# Patient Record
Sex: FEMALE | Race: OTHER
[De-identification: names, ages, dates, MRNs, and addresses within clinical notes are randomized per-mention and may not be internally consistent; named-entity substitution may affect disease eponyms.]

---

## 2021-11-17 ENCOUNTER — HOSPITAL ENCOUNTER (EMERGENCY)
Dept: HOSPITAL 54 - ER | Age: 41
LOS: 2 days | Discharge: LEFT BEFORE DISCHARGE | End: 2021-11-19
Payer: SELF-PAY

## 2021-11-17 VITALS — BODY MASS INDEX: 24.8 KG/M2 | HEIGHT: 63 IN | WEIGHT: 140 LBS

## 2021-11-17 DIAGNOSIS — R00.0: ICD-10-CM

## 2021-11-17 DIAGNOSIS — R45.1: ICD-10-CM

## 2021-11-17 DIAGNOSIS — Z20.822: ICD-10-CM

## 2021-11-17 DIAGNOSIS — F23: Primary | ICD-10-CM

## 2021-11-17 DIAGNOSIS — F15.10: ICD-10-CM

## 2021-11-17 LAB
ALBUMIN SERPL BCP-MCNC: 3.4 G/DL (ref 3.4–5)
ALP SERPL-CCNC: 61 U/L (ref 46–116)
ALT SERPL W P-5'-P-CCNC: 24 U/L (ref 12–78)
APAP SERPL-MCNC: < 0 UG/ML (ref 10–30)
AST SERPL W P-5'-P-CCNC: 32 U/L (ref 15–37)
BASOPHILS # BLD AUTO: 0 K/UL (ref 0–0.2)
BASOPHILS NFR BLD AUTO: 0.4 % (ref 0–2)
BILIRUB DIRECT SERPL-MCNC: 0.2 MG/DL (ref 0–0.2)
BILIRUB SERPL-MCNC: 0.8 MG/DL (ref 0.2–1)
BUN SERPL-MCNC: 17 MG/DL (ref 7–18)
CALCIUM SERPL-MCNC: 8.2 MG/DL (ref 8.5–10.1)
CHLORIDE SERPL-SCNC: 105 MMOL/L (ref 98–107)
CO2 SERPL-SCNC: 25 MMOL/L (ref 21–32)
CREAT SERPL-MCNC: 0.9 MG/DL (ref 0.6–1.3)
EOSINOPHIL NFR BLD AUTO: 0 % (ref 0–6)
ETHANOL SERPL-MCNC: < 3 MG/DL (ref 0–0)
GLUCOSE SERPL-MCNC: 70 MG/DL (ref 74–106)
HCT VFR BLD AUTO: 34 % (ref 33–45)
HGB BLD-MCNC: 11.3 G/DL (ref 11.5–14.8)
LYMPHOCYTES NFR BLD AUTO: 1.5 K/UL (ref 0.8–4.8)
LYMPHOCYTES NFR BLD AUTO: 11.6 % (ref 20–44)
MCHC RBC AUTO-ENTMCNC: 33 G/DL (ref 31–36)
MCV RBC AUTO: 100 FL (ref 82–100)
MONOCYTES NFR BLD AUTO: 0.9 K/UL (ref 0.1–1.3)
MONOCYTES NFR BLD AUTO: 7 % (ref 2–12)
NEUTROPHILS # BLD AUTO: 10.7 K/UL (ref 1.8–8.9)
NEUTROPHILS NFR BLD AUTO: 81 % (ref 43–81)
PLATELET # BLD AUTO: 249 K/UL (ref 150–450)
POTASSIUM SERPL-SCNC: 3.5 MMOL/L (ref 3.5–5.1)
PROT SERPL-MCNC: 7 G/DL (ref 6.4–8.2)
RBC # BLD AUTO: 3.39 MIL/UL (ref 4–5.2)
SODIUM SERPL-SCNC: 141 MMOL/L (ref 136–145)
WBC NRBC COR # BLD AUTO: 13.2 K/UL (ref 4.3–11)

## 2021-11-17 PROCEDURE — 80076 HEPATIC FUNCTION PANEL: CPT

## 2021-11-17 PROCEDURE — 87086 URINE CULTURE/COLONY COUNT: CPT

## 2021-11-17 PROCEDURE — 96372 THER/PROPH/DIAG INJ SC/IM: CPT

## 2021-11-17 PROCEDURE — 80320 DRUG SCREEN QUANTALCOHOLS: CPT

## 2021-11-17 PROCEDURE — 81001 URINALYSIS AUTO W/SCOPE: CPT

## 2021-11-17 PROCEDURE — 87426 SARSCOV CORONAVIRUS AG IA: CPT

## 2021-11-17 PROCEDURE — C9803 HOPD COVID-19 SPEC COLLECT: HCPCS

## 2021-11-17 PROCEDURE — 80307 DRUG TEST PRSMV CHEM ANLYZR: CPT

## 2021-11-17 PROCEDURE — 93005 ELECTROCARDIOGRAM TRACING: CPT

## 2021-11-17 PROCEDURE — 85025 COMPLETE CBC W/AUTO DIFF WBC: CPT

## 2021-11-17 PROCEDURE — 36415 COLL VENOUS BLD VENIPUNCTURE: CPT

## 2021-11-17 PROCEDURE — 80048 BASIC METABOLIC PNL TOTAL CA: CPT

## 2021-11-17 PROCEDURE — 99291 CRITICAL CARE FIRST HOUR: CPT

## 2021-11-17 PROCEDURE — 84484 ASSAY OF TROPONIN QUANT: CPT

## 2021-11-17 PROCEDURE — G0480 DRUG TEST DEF 1-7 CLASSES: HCPCS

## 2021-11-17 PROCEDURE — 80143 DRUG ASSAY ACETAMINOPHEN: CPT

## 2021-11-17 PROCEDURE — 84703 CHORIONIC GONADOTROPIN ASSAY: CPT

## 2021-11-17 NOTE — NUR
[facility] AND [employer] TO ER BED 12. AGITATED, YELLING, SCREAMING AND CURSING. NOT IN 
RESP DISTRESS. BROUGHT IN FOR AGITATED BEHAVIOR AND DANGER TO HER SELF. PT WAS 
REPORTED TO HAVE CLIMBED AND POWER LINE TOWER. PT PLACED ON 5150 HOLD BY BRYSON. 
MD WAS AT THE BEDSIDE FOR EVAL. ORDERS RECEIVED, NOTED AND CARRIED OUT. PT 
MEDICATED AS ORDERED.

## 2021-11-18 LAB
BILIRUB UR QL STRIP: NEGATIVE
COLOR UR: YELLOW
GLUCOSE UR STRIP-MCNC: NEGATIVE MG/DL
LEUKOCYTE ESTERASE UR QL STRIP: NEGATIVE
NITRITE UR QL STRIP: NEGATIVE
PH UR STRIP: 6 [PH] (ref 5–8)
PROT UR QL STRIP: NEGATIVE MG/DL
RBC #/AREA URNS HPF: (no result) /HPF (ref 0–2)
UROBILINOGEN UR STRIP-MCNC: 0.2 EU/DL
WBC #/AREA URNS HPF: (no result) /HPF (ref 0–3)

## 2021-11-19 NOTE — NUR
SS Consult:



SS consult for behavioral.

Pt. Is a 41-year-old female. Pt. does not demonstrate adequate insight to the 
reason for hospitalization. Pt. stated, "it's not a big deal." Per pt., the 
ambulance found her and brought her to the hospital. Per EMR, she was found in 
public trying to climb a power line pole. 

Pt. was oriented x3, alert, and cooperative. During interview, pt. was capable 
of following directions, made appropriate eye-contact, and appeared unkept. 
Evidenced by, bad hygiene. Pt.'s speech was at a normal rate. Pt.'s mood was 
elevated.

SW explored pt.'s Hx of mental health and substance abuse. 

Pt. reported no Hx of mental health, substance abuse, suicidal or homicidal. 
Pt. denies auditory hallucinations, visual hallucinations, paranoia, or 
delusions. Per EMR, pt. is positive for amphetamine. SW explored pt.'s living 
situation. Per pt., she has been homeless for about 3 years. Pt. stated that 
she goes to shelters [unknown name].  

Per pt., she reports having no adequate support. Pt. stated that she is ready 
to be discharged. 



Plan: SW provided available homeless and substance abuse resources and pt. 
accepted. Pt. was willing to sign homeless waiver and is placed in pt.'s chart. 
Per pt., she will use the resources that are provided.



Resources Provided:



Year-round shelters: [facility] [address] [place] [place] [address] 
[phone]; [facility] [address] [place] [address]; 
[facility] [address] [facility] [address] [phone]

Winter Shelters: 

Maria Luisa [place]

Provider: [facility] [place]

Address: [address] [facility] [place], [address]

# of Beds: 47

Phone Number: [phone]

Population Served: [facility] 6 | [facility]

 

Jennifer Manuel Inna

Provider: [facility]

Address: [address] [place] [address], [address] 

# of Beds: 66

Phone Number: [phone]

Population Served: [facility] [place]

 

[place]

Provider: [facility]

Address: [address], [address]

# of Beds: 56

Phone Number: [phone]

Population Served: [place]

 

[place] 

Provider: [facility]

Address: [address] [facility], [address]

# of Beds: 49

Phone Number: [phone]

Population Served: [place]

 

SP[facility] 8 | [facility]

Provider: [facility]

Address: [facility] [facility] [address] [place], [address]

# of Beds: 37

Phone Number: [phone]

Population Served: [place]



Hygiene: [facility]: [address] [place] [phone]; [facility] [address] [facility] [phone]; [facility] [address] 
[place] [phone].

Food Resources: [place] [facility] [address] [facility]; Meet Each Need with [facility] ([facility]) [address] [facility]; [facility] [address] [facility]; 
[facility] [address] [facility]. 

Mental Health resources provided: [facility] [address] [place], [place] [id number] [phone]; [facility] [address] 
[facility] [address], [place] [address] [phone]; [facility] [place] [facility] 
[facility] [address], [place] [id number] [phone]; [facility] [address] [place], [place] 
[address] [phone]

Healthcare Clinics: [facility] [address] [facility], [address] [place]. [place] [phone]; [facility] [address] 
[facility] [address] [place] [address]; [facility] [address] [facility]. [place] [phone]



Counseling--Outpatient

[facility]

[address], [address]

[place] [address]

[phone]

(Specializes in in-depth psychotherapy for emotional distress: anxiety, 
depression, interpersonal conflicts, life transitions, childhood abuse)



[facility]

[address] [place] [address] [phone]

(Assist with solving problem marital difficulties, separation & divorce, aging 
parents, death & grief, chronic & terminal illness)



[facility]

[address] [place] [address] [phone]

(Deal with loss & grief, anxiety, marital difficulties) 



Homebound/Mental Health Services

[address] [facility] [address]

[place] [address] [phone]

(Provide in-home mental services to people who are incapable of leaving their 
homes)



[facility] Elderly

Senior Service/Resource Center

[address] [place] [address] [phone]



[facility] 

[address] [place] [address] [phone]

PSYCHIATRIC OUTPATIENT SERVICES



[facility] Partial Hospitalization and Intensive Outpatient Program 
(Managed Care and [place] Only)[address] [facility] [phone]

[facility]

Partial Hospitalization and Outpatient R[address]fbpoa[address] [facility] [address] [facility]  [address]

[place], [place] [phone]

Critical access hospital [facility] [address] [facility] 
[address]

[place], [place] [phone]

[facility] Partial Hospitalization and Outpatient 
[address], .[phone]



Substance Abuse resources provided included: [facility] Substance Abuse 
Self-Helpline (SAS) [phone]; [facility] [address] [facility] [place] [place] [address] [phone]; [facility] [address] [facility] [address] [phone]; [facility] [address] [place]. [place] [id number] [phone]; [facility] 
[address] [facility] [address] [phone];  [facility] Treatment 
Centers [address] [facility] [address] [phone]; [facility] 
[facility] [address] [facility] [id number] [phone]; [facility] 
Substance Abuse Helpline(SAS)[facility] [phone]; [facility] 
[facility]  [phone]; [facility] [phone] [place]; [facility]  [phone] [place]; [facility]  [phone] [place]; [facility] [phone] Giorgio Johansen; 
[facility]  [phone] [place]; [facility] [phone] 
[place]; [facility] [phone] [place]; [facility] [place], 
[facility]. [phone] [place]; Alcoholics Anonymous (305) 378-1015-SFV; 
[facility] [phone]; Marijuana Anonymous (193) 038-3707-SFV; 
Narcotics Anonymous www.na.org;

## 2021-11-19 NOTE — NUR
PT AWAKE, AND ALERT. VERBALLY RESPONSIVE. REQUESTED TO USE THE BATHROOM. PT WAS 
OBSERVED WALKING TO THE BATHROOM. AMBULATORY W/ STEADY GAITS. REPORTED FEELING 
WELL AND WILLING TO LEAVE. PT WAS INSTRUCTED TO REMAINE IN BED UNTIL SHE IS 
EVALUATED AND IS CLEAR FOR DISCHARGE

## 2021-11-23 VITALS — SYSTOLIC BLOOD PRESSURE: 128 MMHG | DIASTOLIC BLOOD PRESSURE: 75 MMHG
